# Patient Record
(demographics unavailable — no encounter records)

---

## 2019-07-22 NOTE — RAD REPORT
EXAM DESCRIPTION:  US - Abdomen Exam Limited - 7/22/2019 8:34 am

 

CLINICAL HISTORY:  Abdominal pain.

 

COMPARISON:  CT July 22, 2019

 

FINDINGS:  13 millimeter calculus within the gallbladder neck did not change positions. Gallbladder d
istention. Gallbladder wall upper limits normal thickness.

 

Moderate gallbladder sludge. Common bile duct measures 7 millimeters

 

IMPRESSION:  Gallstone may be lodged within the gallbladder neck. Gallbladder is distended.

 

Mild dilatation of the common bile and

## 2019-07-22 NOTE — ER
Nurse's Notes                                                                                     

 [facility]                                                                 

Name: Dianne Peralta                                                                             

Age: 23 yrs                                                                                       

Sex: Female                                                                                       

: 1996                                                                                   

MRN: [id number]                                                                                   

Arrival Date: 2019                                                                          

Time: 06:15                                                                                       

Account#: [id number]                                                                            

Bed 15                                                                                            

Private MD:                                                                                       

Diagnosis: Cholecystitis;Urinary tract infection, site not specified                              

                                                                                                  

Presentation:                                                                                     

                                                                                             

06:15 Presenting complaint: Patient states: "Nontraumatic low back pain radiating to abdomen  cc3 

      since two nights". Transition of care: patient was not received from another setting of     

      care. Onset of symptoms was 2019. Risk Assessment: Do you want to hurt             

      yourself or someone else? Patient reports no desire to harm self or others. Initial         

      Sepsis Screen: Does the patient meet any 2 criteria? No. Patient's initial sepsis           

      screen is negative. Does the patient have a suspected source of infection? No.              

      Patient's initial sepsis screen is negative. Care prior to arrival: None.                   

06:15 Method Of Arrival: Ambulatory                                                           cc3 

06:15 Acuity: KEVIN 3                                                                           cc3 

                                                                                                  

Triage Assessment:                                                                                

06:15 General: Appears in no apparent distress. uncomfortable, Behavior is calm, cooperative, cc3 

      appropriate for age. Pain: Complains of pain in low back Pain radiates to abdomen Pain      

      currently is 4 out of 10 on a pain scale. Quality of pain is described as aching, Pain      

      began 2-3 days ago. EENT: No signs and/or symptoms were reported regarding the EENT         

      system. Neuro: Level of Consciousness is awake, alert, obeys commands, Oriented to          

      person, place, time, situation, Appropriate for age. Cardiovascular: Denies chest pain,     

      Capillary refill < 3 seconds Patient's skin is warm and dry. Respiratory: Airway is         

      patent Respiratory effort is even, unlabored, Respiratory pattern is regular,               

      symmetrical. GI: Abdomen is round. : No signs and/or symptoms were reported regarding     

      the genitourinary system. Derm: Skin is intact, is healthy with good turgor, Skin is        

      pink, warm \T\ dry. normal. Musculoskeletal: Circulation, motion, and sensation intact.     

      Range of motion: intact in all extremities.                                                 

                                                                                                  

OB/GYN:                                                                                           

06:15 LMP was 2nd week of last month as per patient.                                          cc3 

                                                                                                  

Historical:                                                                                       

- Allergies:                                                                                      

06:15 No Known Allergies;                                                                     cc3 

- Home Meds:                                                                                      

06:15 None [Active];                                                                          cc3 

- PMHx:                                                                                           

06:15 Anemia;                                                                                 cc3 

- PSHx:                                                                                           

06:15 None;                                                                                   cc3 

                                                                                                  

- Immunization history:: Adult Immunizations up to date.                                          

- Social history:: Smoking status: Patient uses tobacco products, smokes vape.                    

- Ebola Screening: : No symptoms or risks identified at this time.                                

                                                                                                  

                                                                                                  

Screenin:15 Abuse screen: Denies threats or abuse. Denies injuries from another. Nutritional        cc3 

      screening: No deficits noted. Tuberculosis screening: No symptoms or risk factors           

      identified. Fall Risk Ambulatory Aid- None/Bed Rest/Nurse Assist (0 pts). Gait-             

      Normal/Bed Rest/Wheelchair (0 pts) Mental Status- Oriented to own ability (0 pts).          

                                                                                                  

Assessment:                                                                                       

06:15 General: see triage assessment.                                                         cc3 

07:10 Reassessment: Patient appears in no apparent distress at this time. No changes from     tw2 

      previously documented assessment. Patient and/or family updated on plan of care and         

      expected duration. Pain level reassessed. Patient is alert, oriented x 3, equal             

      unlabored respirations, skin warm/dry/pink.                                                 

08:25 Reassessment: pt in US at this time, unavailable for blood work and vs at this time.    tw2 

09:30 Reassessment: No changes from previously documented assessment. Patient and/or family   tw2 

      updated on plan of care and expected duration. Pain level reassessed. Patient is alert,     

      oriented x 3, equal unlabored respirations, skin warm/dry/pink.                             

10:30 Reassessment: No changes from previously documented assessment. Patient and/or family   tw2 

      updated on plan of care and expected duration. Pain level reassessed. Patient is alert,     

      oriented x 3, equal unlabored respirations, skin warm/dry/pink.                             

11:30 Reassessment: No changes from previously documented assessment. Patient and/or family   tw2 

      updated on plan of care and expected duration. Pain level reassessed. Patient is alert,     

      oriented x 3, equal unlabored respirations, skin warm/dry/pink.                             

12:30 Reassessment: Patient appears in no apparent distress at this time. No changes from     tw2 

      previously documented assessment. Patient and/or family updated on plan of care and         

      expected duration. Pain level reassessed. Patient is alert, oriented x 3, equal             

      unlabored respirations, skin warm/dry/pink.                                                 

13:34 Reassessment: Patient appears in no apparent distress at this time. No changes from     tw2 

      previously documented assessment. Patient and/or family updated on plan of care and         

      expected duration. Pain level reassessed. Patient is alert, oriented x 3, equal             

      unlabored respirations, skin warm/dry/pink.                                                 

14:30 Reassessment: Patient appears in no apparent distress at this time. No changes from     tw2 

      previously documented assessment. Patient and/or family updated on plan of care and         

      expected duration. Pain level reassessed. Patient is alert, oriented x 3, equal             

      unlabored respirations, skin warm/dry/pink.                                                 

15:05 Reassessment: Patient appears in no apparent distress at this time. No changes from     tw2 

      previously documented assessment. Patient and/or family updated on plan of care and         

      expected duration. Pain level reassessed. Patient is alert, oriented x 3, equal             

      unlabored respirations, skin warm/dry/pink.                                                 

16:00 Reassessment: Patient appears in no apparent distress at this time. No changes from     tw2 

      previously documented assessment. Patient and/or family updated on plan of care and         

      expected duration. Pain level reassessed. Patient is alert, oriented x 3, equal             

      unlabored respirations, skin warm/dry/pink.                                                 

17:14 Reassessment: Patient appears in no apparent distress at this time. No changes from     tw2 

      previously documented assessment. Patient and/or family updated on plan of care and         

      expected duration. Pain level reassessed. Patient is alert, oriented x 3, equal             

      unlabored respirations, skin warm/dry/pink.                                                 

17:53 Reassessment: Patient appears in no apparent distress at this time. No changes from     tw2 

      previously documented assessment. Patient and/or family updated on plan of care and         

      expected duration. Pain level reassessed. Patient is alert, oriented x 3, equal             

      unlabored respirations, skin warm/dry/pink.                                                 

                                                                                                  

Vital Signs:                                                                                      

06:15  / 73; Pulse 92; Resp 18 S; Temp 98.2(O); Pulse Ox 99% on R/A; Weight 72.57 kg    cc3 

      (R); Height 5 ft. 2 in. (157.48 cm) (R); Pain 4/10;                                         

07:40  / 75; Pulse 58; Resp 16; Temp 98.4(O); Pulse Ox 100% on R/A;                     mh5 

08:45  / 62; Pulse 69; Resp 16; Temp 98.7(O); Pulse Ox 100% on R/A;                     mh5 

09:30  / 65; Pulse 62; Resp 17; Temp 98.1(O); Pulse Ox 99% on R/A;                      mh5 

10:30  / 71; Pulse 65; Resp 17; Pulse Ox 100% on R/A;                                   tw2 

11:30 BP 97 / 67; Pulse 67; Resp 17; Pulse Ox 100% on R/A;                                    tw2 

12:05 BP 95 / 62; Pulse 71; Resp 16; Temp 98.2(O); Pulse Ox 99% on R/A;                       mh5 

13:28  / 68; Pulse 60; Resp 17; Pulse Ox 100% on R/A;                                   tw2 

14:15  / 73; Pulse 54; Resp 17; Pulse Ox 99% on R/A;                                    tw2 

15:04  / 72; Pulse 57; Resp 17; Pulse Ox 100% on R/A;                                   tw2 

16:00  / 61; Pulse 54; Resp 17; Pulse Ox 99% on R/A;                                    tw2 

17:13  / 64; Pulse 59; Resp 17; Pulse Ox 98% on R/A;                                    tw2 

17:53  / 59; Pulse 96; Resp 17; Pulse Ox 100% on R/A;                                   tw2 

06:15 Body Mass Index 29.26 (72.57 kg, 157.48 cm)                                             cc3 

                                                                                                  

ED Course:                                                                                        

06:15 Patient arrived in ED.                                                                  ag3 

06:15 Arm band placed on right wrist. Patient notified of wait time.                          cc3 

06:15 Patient has correct armband on for positive identification. Bed in low position. Call   cc3 

      light in reach. Side rails up X 1. Pulse ox on. NIBP on.                                    

06:19 Tarsha Hastings FNP-C is PHCP.                                                        snw 

06:19 Jadon Whitten MD is Attending Physician.                                            snw 

06:31 Triage completed.                                                                       cc3 

07:00 Report given to JAYSON Clayton.                                                                cc3 

07:01 Matilde Kim RN is Primary Nurse.                                                        tw2 

07:17 CT Stone Protocol In Process Unspecified.                                               EDMS

08:36 US Abdomen Limited In Process Unspecified.                                              EDMS

08:50 Inserted saline lock: 22 gauge in right antecubital area, using aseptic technique.      tw2 

      Blood collected.                                                                            

09:34 Coral Damian MD is Hospitalizing Provider.                                            snw 

13:47 Cholangiogram In Process Unspecified.                                                   EDMS

14:28 Deion Monroe MD is Hospitalizing Provider.                                          snw 

17:52 No provider procedures requiring assistance completed. Patient admitted, IV remains in  tw2 

      place.                                                                                      

                                                                                                  

Administered Medications:                                                                         

09:47 Drug: Zosyn 3.375 grams Route: IVPB; Infused Over: 60 mins; Site: right antecubital;    tw2 

10:50 Follow up: Response: No adverse reaction; IV Status: Completed infusion                 tw2 

14:05 Drug: NS 0.9% 1000 ml Route: IV; Rate: 100 ml/hr; Site: right antecubital;              tw2 

17:23 Drug: Zosyn 3.375 grams Route: IVPB; Infused Over: 60 mins; Site: right antecubital;    tw2 

18:42 Follow up: IV Status: Infusion continued upon admission                                 tw2 

                                                                                                  

                                                                                                  

Outcome:                                                                                          

09:35 Decision to Hospitalize by Provider.                                                    snw 

14:29 Decision to Hospitalize by Provider.                                                    snw 

17:52 Admitted to Med/surg accompanied by tech, via wheelchair, room 429, with chart, Report  tw2 

      called to  JAYSON Mccurdy                                                                           

17:52 Condition: stable                                                                           

17:52 Instructed on the need for admit.                                                           

18:42 Patient left the ED.                                                                    ae4 

                                                                                                  

Signatures:                                                                                       

Dispatcher [employer]                           EDMS                                                 

Tarsha Hastings, FNP-C                 FNP-Matilde Godoy RN                          RN   tw2                                                  

Tyesha Monroe                              5                                                  

Chhaya Dela Cruz                             3                                                  

Judy Morocho                                 3                                                  

Efren Burnett RN                     RN   ae4                                                  

                                                                                                  

**************************************************************************************************

## 2019-07-22 NOTE — EDPHYS
Physician Documentation                                                                           

 [facility]                                                                 

Name: Dianne Peralta                                                                             

Age: 23 yrs                                                                                       

Sex: Female                                                                                       

: 1996                                                                                   

MRN: [id number]                                                                                   

Arrival Date: 2019                                                                          

Time: 06:15                                                                                       

Account#: [id number]                                                                            

Bed 15                                                                                            

Private MD:                                                                                       

ED Physician Jadon Whitten                                                                     

HPI:                                                                                              

                                                                                             

06:46 This 23 yrs old Female presents to ER via Ambulatory with complaints of Back Pain.      snw 

06:46 The patient presents with pain that is acute, with no known mechanism of injury. The    snw 

      symptoms are located in the low back. Onset: The symptoms/episode began/occurred            

      suddenly, 2 day(s) ago. Location: left lower quadrant and right lower quadrant.             

      Associated signs and symptoms: Pertinent positives: vomiting. The problem was sustained     

      from unknown cause. Severity of symptoms: At their worst the symptoms were moderate,        

      severe. The patient has not experienced similar symptoms in the past. The patient has       

      not recently seen a physician, and does not have an established primary care provider,      

      just moved to area.                                                                         

                                                                                                  

OB/GYN:                                                                                           

06:15 LMP was 2nd week of last month as per patient.                                          cc3 

                                                                                                  

Historical:                                                                                       

- Allergies:                                                                                      

06:15 No Known Allergies;                                                                     cc3 

- Home Meds:                                                                                      

06:15 None [Active];                                                                          cc3 

- PMHx:                                                                                           

06:15 Anemia;                                                                                 cc3 

- PSHx:                                                                                           

06:15 None;                                                                                   cc3 

                                                                                                  

- Immunization history:: Adult Immunizations up to date.                                          

- Social history:: Smoking status: Patient uses tobacco products, smokes vape.                    

- Ebola Screening: : No symptoms or risks identified at this time.                                

                                                                                                  

                                                                                                  

ROS:                                                                                              

06:46 Constitutional: Negative for fever, chills, and weight loss, Eyes: Negative for injury, snw 

      pain, redness, and discharge, ENT: Negative for injury, pain, and discharge, Neck:          

      Negative for injury, pain, and swelling, Cardiovascular: Negative for chest pain,           

      palpitations, and edema, Respiratory: Negative for shortness of breath, cough,              

      wheezing, and pleuritic chest pain, : Negative for injury, bleeding, discharge, and       

      swelling, MS/Extremity: Negative for injury and deformity, Skin: Negative for injury,       

      rash, and discoloration, Neuro: Negative for headache, weakness, numbness, tingling,        

      and seizure.                                                                                

06:46 Abdomen/GI: Positive for abdominal pain, vomiting.                                          

06:46 Back: Positive for pain at rest, pain with movement, of the lumbar area.                    

                                                                                                  

Exam:                                                                                             

06:46 Constitutional:  This is a well developed, well nourished patient who is awake, alert,  snw 

      and in no acute distress. Head/Face:  Normocephalic, atraumatic. Eyes:  Pupils equal        

      round and reactive to light, extra-ocular motions intact.  Lids and lashes normal.          

      Conjunctiva and sclera are non-icteric and not injected.  Cornea within normal limits.      

      Periorbital areas with no swelling, redness, or edema. ENT:  Nares patent. No nasal         

      discharge, no septal abnormalities noted.  Tympanic membranes are normal and external       

      auditory canals are clear.  Oropharynx with no redness, swelling, or masses, exudates,      

      or evidence of obstruction, uvula midline.  Mucous membranes moist. Neck:  Trachea          

      midline, no thyromegaly or masses palpated, and no cervical lymphadenopathy.  Supple,       

      full range of motion without nuchal rigidity, or vertebral point tenderness.  No            

      Meningismus. Chest/axilla:  Normal chest wall appearance and motion.  Nontender with no     

      deformity.  No lesions are appreciated. Cardiovascular:  Regular rate and rhythm with a     

      normal S1 and S2.  No gallops, murmurs, or rubs.  Normal PMI, no JVD.  No pulse             

      deficits. Respiratory:  Lungs have equal breath sounds bilaterally, clear to                

      auscultation and percussion.  No rales, rhonchi or wheezes noted.  No increased work of     

      breathing, no retractions or nasal flaring. Skin:  Warm, dry with normal turgor.            

      Normal color with no rashes, no lesions, and no evidence of cellulitis. MS/ Extremity:      

      Pulses equal, no cyanosis.  Neurovascular intact.  Full, normal range of motion. Neuro:     

       Awake and alert, GCS 15, oriented to person, place, time, and situation.  Cranial          

      nerves II-XII grossly intact.  Motor strength 5/5 in all extremities.  Sensory grossly      

      intact.  Cerebellar exam normal.  Normal gait. Psych:  Awake, alert, with orientation       

      to person, place and time.  Behavior, mood, and affect are within normal limits.            

06:46 Abdomen/GI: Inspection: abdomen appears normal, Bowel sounds: active, Palpation:            

      moderate abdominal tenderness, in the right lower quadrant and left lower quadrant.         

06:46 Back: pain, that is mild, ROM is painful, with flexion, normal spinal alignment noted,      

      CVA tenderness, is absent, muscle spasm, is not present.                                    

                                                                                                  

Vital Signs:                                                                                      

06:15  / 73; Pulse 92; Resp 18 S; Temp 98.2(O); Pulse Ox 99% on R/A; Weight 72.57 kg    cc3 

      (R); Height 5 ft. 2 in. (157.48 cm) (R); Pain 4/10;                                         

07:40  / 75; Pulse 58; Resp 16; Temp 98.4(O); Pulse Ox 100% on R/A;                     mh5 

08:45  / 62; Pulse 69; Resp 16; Temp 98.7(O); Pulse Ox 100% on R/A;                     mh5 

09:30  / 65; Pulse 62; Resp 17; Temp 98.1(O); Pulse Ox 99% on R/A;                      mh5 

10:30  / 71; Pulse 65; Resp 17; Pulse Ox 100% on R/A;                                   tw2 

11:30 BP 97 / 67; Pulse 67; Resp 17; Pulse Ox 100% on R/A;                                    tw2 

12:05 BP 95 / 62; Pulse 71; Resp 16; Temp 98.2(O); Pulse Ox 99% on R/A;                       mh5 

13:28  / 68; Pulse 60; Resp 17; Pulse Ox 100% on R/A;                                   tw2 

14:15  / 73; Pulse 54; Resp 17; Pulse Ox 99% on R/A;                                    tw2 

15:04  / 72; Pulse 57; Resp 17; Pulse Ox 100% on R/A;                                   tw2 

16:00  / 61; Pulse 54; Resp 17; Pulse Ox 99% on R/A;                                    tw2 

17:13  / 64; Pulse 59; Resp 17; Pulse Ox 98% on R/A;                                    tw2 

17:53  / 59; Pulse 96; Resp 17; Pulse Ox 100% on R/A;                                   tw2 

06:15 Body Mass Index 29.26 (72.57 kg, 157.48 cm)                                             cc3 

                                                                                                  

MDM:                                                                                              

06:19 Patient medically screened.                                                             snw 

09:33 Data reviewed: vital signs, nurses notes. Data interpreted: Pulse oximetry: on room air snw 

      is 99 %. Interpretation: normal. Counseling: I had a detailed discussion with the           

      patient and/or guardian regarding: the historical points, exam findings, and any            

      diagnostic results supporting the discharge/admit diagnosis, lab results, radiology         

      results, the need for further work-up and treatment in the hospital. Physician              

      consultation: Coral Damian MD was called at 09:34, was contacted at 09:34, regarding        

      admission, to the medical/surgical unit.                                                    

10:20 Physician consultation: Coral Damian MD regarding declines admission as GI unsure if pt snw 

      will need ERCP. Case discussed with Dr. Bravo in ED. Requests MRCP be done to eval       

      location of stone. Will follow results and decide dispo.                                    

14:26 Physician consultation: Deion Monroe MD was called at 14:28, was contacted at 14:28,  snw 

      regarding admission, to the medical/surgical unit. Keep pt NPO.                             

17:56 Physician consultation: Deion Monroe MD in the emergency department to see patient at snw 

      17:56.                                                                                      

                                                                                                  

                                                                                             

06:20 Order name: Urine Culture                                                               sn 

                                                                                             

06:20 Order name: Urine Microscopic Only; Complete Time: 07:17                                snw 

                                                                                             

06:57 Order name: Urine Dipstick--Ancillary (enter results); Complete Time: 07:03             cm6 

                                                                                             

06:57 Order name: Urine Pregnancy--Ancillary (enter results); Complete Time: 07:03            cm6 

                                                                                             

08:16 Order name: Basic Metabolic Panel; Complete Time: 09:27                                 snw 

                                                                                             

08:16 Order name: CBC with Diff; Complete Time: 09:06                                         snw 

                                                                                             

07:05 Order name: CT Stone Protocol; Complete Time: 08:13                                     snw 

                                                                                             

08:16 Order name: US Abdomen Limited; Complete Time: 09:06                                    snw 

                                                                                             

08:16 Order name: Hepatic Function; Complete Time: 09:27                                      snw 

                                                                                             

10:46 Order name: Cholangiogram; Complete Time: 14:09                                         EDMS

                                                                                             

14:36 Order name: Basic Metabolic Panel                                                       EDMS

                                                                                             

14:36 Order name: Basic Metabolic Panel                                                       EDMS

                                                                                             

14:36 Order name: Lipase                                                                      EDMS

                                                                                             

06:20 Order name: Urine Pregnancy Test (obtain specimen); Complete Time: 06:56                snw 

                                                                                             

06:20 Order name: Urine Dipstick-Ancillary (obtain specimen); Complete Time: 06:56            snw 

                                                                                             

08:16 Order name: IV Saline Lock; Complete Time: 08:50                                        [facility] 

                                                                                             

08:16 Order name: Labs collected and sent; Complete Time: 08:50                               [facility] 

                                                                                             

14:37 Order name: NPO; Complete Time: 15:04                                                   EDMS

                                                                                                  

Administered Medications:                                                                         

09:47 Drug: Zosyn 3.375 grams Route: IVPB; Infused Over: 60 mins; Site: right antecubital;    tw2 

10:50 Follow up: Response: No adverse reaction; IV Status: Completed infusion                 tw2 

14:05 Drug: NS 0.9% 1000 ml Route: IV; Rate: 100 ml/hr; Site: right antecubital;              tw2 

17:23 Drug: Zosyn 3.375 grams Route: IVPB; Infused Over: 60 mins; Site: right antecubital;    tw2 

18:42 Follow up: IV Status: Infusion continued upon admission                                 tw2 

                                                                                                  

                                                                                                  

Disposition:                                                                                      

                                                                                             

04:28 Co-signature as Attending Physician, Matilde Kim RN I agree with the assessment and plan  tw4 

      of care.                                                                                    

                                                                                                  

Disposition:                                                                                      

19 14:29 Hospitalization ordered by Deion Monroe for Observation. Preliminary            

  diagnosis are Cholecystitis, Urinary tract infection, site not specified.                       

- Bed requested for Telemetry/MedSurg (observation).                                              

- Status is Observation.                                                                      ae4 

- Condition is Stable.                                                                            

- Problem is new.                                                                                 

- Symptoms are unchanged.                                                                         

UTI on Admission? Yes                                                                             

                                                                                                  

                                                                                                  

                                                                                                  

Signatures:                                                                                       

Dispatcher [employer]                           EDMS                                                 

Leigh Roper RN RN                                                      

Tarsha Hastinsg, FNP-C                 FNP-Csnw                                                  

Matilde Kim RN                          RN   tw2                                                  

Jadon Whitten MD MD   tw4                                                  

Chhaya Dela Cruz                             3                                                  

Efren Burnett RN                     RN   ae4                                                  

                                                                                                  

Corrections: (The following items were deleted from the chart)                                    

                                                                                             

12:12 09:35 Hospitalization Ordered by Coral Damian MD for Inpatient Admission. Preliminary   [facility] 

      diagnosis is Other abdominal pain; Cholecystitis; Cholelithiasis; Urinary tract             

      infection, site not specified. Bed requested for Telemetry/MedSurg (Inpatient). Status      

      is Inpatient Admission. Condition is Stable. Problem is new. Symptoms are unchanged.        

      UTI on Admission? Yes. [facility]                                                                  

17:29 14:29 Hospitalization Ordered by Deion Monroe MD for Observation. Preliminary         dw  

      diagnosis is Cholecystitis; Urinary tract infection, site not specified. Bed requested      

      for Telemetry/MedSurg (observation). Status is Observation. Condition is Stable.            

      Problem is new. Symptoms are unchanged. UTI on Admission? Yes. snw                          

18:42 17:29 2019 14:29 Hospitalization Ordered by Deion Monroe MD for Observation.    ae4 

      Preliminary diagnosis is Cholecystitis; Urinary tract infection, site not specified.        

      Bed requested for Telemetry/MedSurg (observation). Status is Observation. Condition is      

      Stable. Problem is new. Symptoms are unchanged. UTI on Admission? Yes. dw                   

                                                                                                  

**************************************************************************************************

## 2019-07-22 NOTE — RAD REPORT
EXAM DESCRIPTION:  CT - Stone Protocol - 7/22/2019 7:15 am

 

CLINICAL HISTORY:  Abdominal pain. Lower abdominal pain. Urinary frequency

 

COMPARISON:  None.

 

TECHNIQUE:  Computed axial tomography of the abdomen pelvis was obtained without oral or IV contrast.
 Lack of IV and oral contrast limits evaluation of solid organs, bowel, and vessels. Coronal reformat
christoph images were obtained and reviewed.

 

All CT scans are performed using dose optimization technique as appropriate and may include automated
 exposure control or mA/KV adjustment according to patient size.

 

FINDINGS:  A renal calculus is not seen. An ureteral calculus is not noted. A bladder calculus is not
 present.

 

A gallstone is present. The gallbladder is mildly distended.

 

The liver, spleen, pancreas and adrenals appear grossly normal

 

There is no evidence of diverticulitis. The appendix appears normal

 

IMPRESSION:  Negative for a genitourinary calculus

 

Cholelithiasis with mild gallbladder distention

## 2019-07-22 NOTE — RAD REPORT
EXAM DESCRIPTION:  MRICholangiogram7/22/2019 1:45 pm

 

CLINICAL HISTORY:  Abdominal pain

 

COMPARISON:  July 22, 2019 cat scan

 

TECHNIQUE:  Magnetic resonance cholangiogram was performed. Mip reconstruction performed

 

FINDINGS:  The gallbladder is mildly distended. A 13 millimeter stone is present within the gallbladd
er neck. Gallbladder wall is not thickened.

 

The biliary tree is upper limits normal caliber without a filling defect.

 

Pancreatic duct is normal caliber

 

IMPRESSION:  Cholelithiasis. Stone may be lodged within the gallbladder neck. Mild gallbladder disten
tion

## 2019-07-23 NOTE — HP
Date of Admission:  07/22/2019



Diagnoses:  Acute cholecystitis, symptomatic cholelithiasis, and urinary tract infection.



History Of Present Illness:  This is the case of a 23-year-old patient, comes to us with a 2-day hist
ory of epigastric right upper quadrant pain radiating to the back with nausea and vomiting.  Patient 
states she has not been to this area for a long time.  She denies any dysuria, hematuria, hematochezi
a, or melena.  Denies any recent traveling out of the country.  Denies any family member sick at home
.  She stated this is not the first time that happened, although this time, it is more severe.



Past Medical History:  None other than mild anemia.



Past Surgical History:  None.



Allergies:  NONE.



Social History:  She does not smoke.  She does not drink alcohol.  Patient smoked vape, although she 
was advised and counseled on cessation of it.



Family History:  Noncontributory.



Review of Systems:

Ten points otherwise unremarkable.



Physical Examination:

General:  Patient is awake and alert. 

HEENT:  Pupils are equal and reactive, anicteric. 

Neck:  Supple. 

Chest:  Clear. 

Heart:  S1, S2. 

Abdomen:  Epigastric right upper quadrant pain with radiation to the back with nausea, vomiting yeste
rday and the pain continued the same.  Friend sign positive.  Guarding over the right upper quadrant.
  The rest of abdomen is soft and depressible. 

Pelvic:  Deferred. 

Rectal:  Deferred. 

Breasts:  Deferred. 

Extremities:  Good capillary refill. 



Neurologic:  Cranial nerves 2 through 12 grossly within normal limits.



Laboratory Data:  Blood work shows WBC count of 9, hemoglobin of 14.2, platelets of 203, chloride is 
109.  UA:  Rbc's 5-10, bacteria 20-50. 



CAT scan of abdomen and pelvis interpreted by Dr. Williamson as cholelithiasis and mild gallbladder dist
ention.  Negative for genitourinary calculus.  Abdominal ultrasound interpreted by Dr. Williamson as gal
lstone may be lodged within the neck of the gallbladder.  Gallbladder is distended, mild dilatation o
f the common bile duct.  MRCP interpreted by Dr. Williamson as cholelithiasis still may be lodged within
 the neck of the gallbladder neck, mild gallbladder distention.  The biliary tree is upper limit of n
ormal without any filling defects.



Assessment And Plan:  A 23-year-old patient, comes to us with acute cholecystitis, symptomatic cholel
ithiasis.  The benefits, alternatives, and risks of laparoscopic, possible open cholecystectomy fully
 explained to the patient, which include but are not limited to infection, bleeding, damage to adjace
nt structures, anesthesia complication, choledocholithiasis, bile leak, pancreatitis, MI, and even de
ath.  She also understands this may not relieve any symptoms.  She might need more than one surgical 
intervention.  She wants to have the gallbladder on this admission.  She does not want to have electi
ve cholecystectomy.





FIDENCIO

DD:  07/22/2019 19:26:55Voice ID:  [id number]

## 2019-07-23 NOTE — OP
Date of Procedure:  07/23/2019



Surgeon:  Deion Monroe MD



Preoperative Diagnoses:  Acute cholecystitis and symptomatic cholelithiasis.



Postoperative Diagnosis:  Acute cholecystitis and symptomatic cholelithiasis.



Procedure Performed:  Laparoscopic cholecystectomy.



Anesthesia:  General plus local.



Indications:  This is a case of a 23-year-old patient, who comes to us with intractable epigastric ri
ght upper quadrant pain.  Friend sign positive, diagnosed with acute cholecystitis and symptomatic ch
olelithiasis.  The benefits, alternatives, and risks of laparoscopic possible open cholecystectomy fu
lly explained to the patient, which include, but are not limited to infection, bleeding, damage to ad
jacent structures, anesthesia complication, bile leak, choledocholithiasis, bile leak, pancreatitis, 
MI, even death.  She also understands this may not relieve any symptoms.  She might need more than on
e surgical intervention.  She understood, signed a consent and wanted surgery done during this admiss
ion.



Description Of Procedure:  Patient was brought to the operating room, placed in supine position.  Ane
sthesia was done without complication.  Abdominal area was prepped and draped in a sterile fashion.  
Marcaine 0.5% was injected for local anesthetic, followed by sharp incision of the skin in the infrau
mbilical region.  Incision was carried down to fascia, which was opened under direct vision.  Periton
eum was encountered, opened under direct vision.  Vicryl #1 placed inside the fascia.  Jean Carlos trocar 
was carefully introduced.  No bleeding was obtained.  I placed 3 more trocars, 5 mm each one of them,
 in the right upper quadrant under direct visualization.  I put a grasper in the fundus of the gallbl
adder, another grasper in the infundibulum, retracted the gallbladder in the inferolateral fashion ex
posing the triangle of Calot and obtaining critical view of safety.  Cystic duct and cystic artery we
re clearly isolated free circumferentially and a connection between the duct and the gallbladder were
 clearly identified.  We proceeded to ligate those by using at least 3 clips proximal, 1 clip distal,
 ligation in the middle.  Same was done with the cystic artery.  No bile leak.  No bleeding.  The hep
atic arteries and common bile duct were protected at all times.  The gallbladder removed from the michael
er using Bovie cauterizer and removed from abdominal cavity using an EndoCatch through the umbilical 
incision.  The area was inspected once again with no bleeding.  Gallbladder fossa intact with no blee
ding.  Clips were intact.  At that moment, I proceeded to remove the trocars under direct vision.  De
flated the pneumoperitoneum.  Closed the fascia with #1 Vicryl.  Irrigated subcu tissue, closed that 
with chromic and skin with staples.  Sponge count and instrument counts were correct.  The patient to
lerated the procedure well.  The patient was sent to recovery in stable condition.  The patient jossue
ated diet, lunch, and dinner, then patient can be discharged home.  If she wants to be discharged and
 follow my office in 1 week, call for appointment at [phone].  Keep area dry for 48 hours, then may 
shower. 



Medications will include Tylenol No.3 q.4 hours p.r.n. pain, Bactrim DS p.o. b.i.d.  Patient was advi
sed not to do heavy lifting and followup in my office in 1 week.  Call for appointment [phone].





ADELAIDA/MATT

DD:  07/23/2019 08:19:28Voice ID:  [id number]

DT:  07/23/2019 18:47:15Report ID:  [id number]

## 2019-07-23 NOTE — P.BOP
Preoperative diagnosis: Acute cholecystitis, symotomatic cholelithiasis


Postoperative diagnosis: same


Primary procedure: Laparoscopic cholecystectomy


Estimated blood loss: <10cc


Specimen: gb


Findings: as above


Anesthesia: General


Complications: None


Transferred to: Recovery Room


Condition: Good